# Patient Record
Sex: MALE | Race: WHITE
[De-identification: names, ages, dates, MRNs, and addresses within clinical notes are randomized per-mention and may not be internally consistent; named-entity substitution may affect disease eponyms.]

---

## 2017-08-28 ENCOUNTER — HOSPITAL ENCOUNTER (EMERGENCY)
Dept: HOSPITAL 76 - ED | Age: 47
Discharge: HOME | End: 2017-08-28
Payer: MEDICAID

## 2017-08-28 VITALS — SYSTOLIC BLOOD PRESSURE: 116 MMHG | DIASTOLIC BLOOD PRESSURE: 77 MMHG

## 2017-08-28 DIAGNOSIS — L50.0: Primary | ICD-10-CM

## 2017-08-28 DIAGNOSIS — F17.200: ICD-10-CM

## 2017-08-28 PROCEDURE — 99283 EMERGENCY DEPT VISIT LOW MDM: CPT

## 2017-08-28 PROCEDURE — 99282 EMERGENCY DEPT VISIT SF MDM: CPT

## 2017-11-13 ENCOUNTER — HOSPITAL ENCOUNTER (OUTPATIENT)
Dept: HOSPITAL 76 - LAB.R | Age: 47
Discharge: HOME | End: 2017-11-13
Attending: NURSE PRACTITIONER
Payer: MEDICAID

## 2017-11-13 DIAGNOSIS — N45.4: ICD-10-CM

## 2017-11-13 DIAGNOSIS — S31.104A: Primary | ICD-10-CM

## 2017-11-13 PROCEDURE — 87205 SMEAR GRAM STAIN: CPT

## 2017-11-13 PROCEDURE — 87070 CULTURE OTHR SPECIMN AEROBIC: CPT

## 2018-01-26 ENCOUNTER — HOSPITAL ENCOUNTER (OUTPATIENT)
Dept: HOSPITAL 76 - SDS | Age: 48
Discharge: HOME | End: 2018-01-26
Attending: SURGERY
Payer: MEDICAID

## 2018-01-26 VITALS — DIASTOLIC BLOOD PRESSURE: 80 MMHG | SYSTOLIC BLOOD PRESSURE: 141 MMHG

## 2018-01-26 DIAGNOSIS — L72.0: ICD-10-CM

## 2018-01-26 DIAGNOSIS — L72.3: Primary | ICD-10-CM

## 2018-01-26 PROCEDURE — 0JB80ZZ EXCISION OF ABDOMEN SUBCUTANEOUS TISSUE AND FASCIA, OPEN APPROACH: ICD-10-PCS | Performed by: SURGERY

## 2018-01-26 PROCEDURE — 12032 INTMD RPR S/A/T/EXT 2.6-7.5: CPT

## 2018-01-26 PROCEDURE — 11404 EXC TR-EXT B9+MARG 3.1-4 CM: CPT

## 2019-02-07 ENCOUNTER — HOSPITAL ENCOUNTER (OUTPATIENT)
Dept: HOSPITAL 76 - LAB.N | Age: 49
Discharge: HOME | End: 2019-02-07
Attending: PHYSICIAN ASSISTANT
Payer: MEDICAID

## 2019-02-07 DIAGNOSIS — R50.9: Primary | ICD-10-CM

## 2019-02-07 PROCEDURE — 87275 INFLUENZA B AG IF: CPT

## 2019-02-07 PROCEDURE — 87276 INFLUENZA A AG IF: CPT

## 2019-03-14 ENCOUNTER — HOSPITAL ENCOUNTER (OUTPATIENT)
Age: 49
End: 2019-03-14
Payer: COMMERCIAL

## 2019-03-14 DIAGNOSIS — M25.531: Primary | ICD-10-CM

## 2019-03-14 PROCEDURE — 73110 X-RAY EXAM OF WRIST: CPT

## 2019-03-14 NOTE — DI.RAD.S_ITS
PROCEDURE:  XR WRIST RT MIN 3V  
   
INDICATIONS: wrist pain  
   
TECHNIQUE: 4 views of the wrist were acquired.    
   
COMPARISON:  None.  
   
FINDINGS:    
   
Bones:  No fractures or dislocations.  No suspicious bony lesions.    
   
Scaphoid view: Scaphoid is intact.  
   
Soft tissues:  No suspicious soft tissue calcifications.    
   
IMPRESSION: No fracture or dislocation.  
   
   
   
Dictated by: Klaus Hatch M.D. on 3/14/2019 at 10:09       
Approved by: Klaus Hatch M.D. on 3/14/2019 at 10:10

## 2019-10-16 ENCOUNTER — HOSPITAL ENCOUNTER (EMERGENCY)
Dept: HOSPITAL 76 - ED | Age: 49
Discharge: HOME | End: 2019-10-16
Payer: MEDICAID

## 2019-10-16 VITALS — SYSTOLIC BLOOD PRESSURE: 133 MMHG | DIASTOLIC BLOOD PRESSURE: 85 MMHG

## 2019-10-16 DIAGNOSIS — L98.491: Primary | ICD-10-CM

## 2019-10-16 DIAGNOSIS — F17.200: ICD-10-CM

## 2019-10-16 PROCEDURE — 99282 EMERGENCY DEPT VISIT SF MDM: CPT

## 2019-10-16 PROCEDURE — 99283 EMERGENCY DEPT VISIT LOW MDM: CPT

## 2020-02-04 ENCOUNTER — HOSPITAL ENCOUNTER (OUTPATIENT)
Age: 50
End: 2020-02-04
Payer: COMMERCIAL

## 2020-02-04 DIAGNOSIS — R50.9: Primary | ICD-10-CM

## 2020-02-04 LAB
FLUAV RNA UPPER RESP QL NAA+PROBE: (no result)
FLUBV RNA UPPER RESP QL NAA+PROBE: (no result)

## 2020-02-04 PROCEDURE — 87502 INFLUENZA DNA AMP PROBE: CPT

## 2020-03-09 ENCOUNTER — HOSPITAL ENCOUNTER (OUTPATIENT)
Age: 50
End: 2020-03-09
Payer: COMMERCIAL

## 2020-03-09 DIAGNOSIS — K62.5: Primary | ICD-10-CM

## 2020-03-09 LAB
ADD MANUAL DIFF / SLIDE REVIEW: NO
ALBUMIN SERPL-MCNC: 4.5 G/DL (ref 3.5–5)
ALBUMIN/GLOB SERPL: 1.4 {RATIO} (ref 1–2.8)
ALP SERPL-CCNC: 107 U/L (ref 38–126)
ALT SERPL-CCNC: 37 IU/L (ref ?–50)
BUN SERPL-MCNC: 13 MG/DL (ref 9–20)
CALCIUM SERPL-MCNC: 10.3 MG/DL (ref 8.4–10.2)
CHLORIDE SERPL-SCNC: 106 MMOL/L (ref 98–107)
CO2 SERPL-SCNC: 26 MMOL/L (ref 22–32)
ESTIMATED GLOMERULAR FILT RATE: > 60 ML/MIN (ref 60–?)
GLOBULIN SER CALC-MCNC: 3.3 G/DL (ref 1.7–4.1)
GLUCOSE SERPL-MCNC: 119 MG/DL (ref 70–100)
HEMATOCRIT: 51 % (ref 41–53)
HEMOGLOBIN: 18 G/DL (ref 13.5–17.5)
HEMOLYSIS: 16 (ref 0–50)
INR PPP: 0.9 (ref 0.9–1.3)
LYMPHOCYTES # SPEC AUTO: 2000 /UL (ref 1100–4500)
MCV RBC: 101.8 FL (ref 80–100)
MEAN CORPUSCULAR HEMOGLOBIN: 35.9 PG (ref 26–34)
MEAN CORPUSCULAR HGB CONC: 35.2 % (ref 30–36)
PLATELET COUNT: 147 X10^3/UL (ref 150–400)
POTASSIUM SERPL-SCNC: 4.7 MMOL/L (ref 3.4–5.1)
PROT SERPL-MCNC: 7.8 G/DL (ref 6.3–8.2)
PROTHROMBIN TIME: 10.5 SECONDS (ref 10.1–12.7)
SODIUM SERPL-SCNC: 140 MMOL/L (ref 137–145)

## 2020-03-09 PROCEDURE — 36415 COLL VENOUS BLD VENIPUNCTURE: CPT

## 2020-03-09 PROCEDURE — 85025 COMPLETE CBC W/AUTO DIFF WBC: CPT

## 2020-03-09 PROCEDURE — 85610 PROTHROMBIN TIME: CPT

## 2020-03-09 PROCEDURE — 80053 COMPREHEN METABOLIC PANEL: CPT

## 2020-03-18 ENCOUNTER — HOSPITAL ENCOUNTER (OUTPATIENT)
Age: 50
End: 2020-03-18
Payer: COMMERCIAL

## 2020-03-18 DIAGNOSIS — J34.2: ICD-10-CM

## 2020-03-18 DIAGNOSIS — J32.4: Primary | ICD-10-CM

## 2020-03-18 PROCEDURE — 70486 CT MAXILLOFACIAL W/O DYE: CPT

## 2020-03-18 NOTE — DI.CT.S_ITS
PROCEDURE:  CT SINUS SCREEN WO CON  
   
INDICATIONS:  sinsusitis worsening sxs  
   
TECHNIQUE:   
Noncontrast 3.0 mm axial images acquired from the frontal sinuses to the mid-sella, with   
coronal and sagittal reformats.  For radiation dose reduction, the following was used:    
automated exposure control, adjustment of mA and/or kV according to patient size.    
   
COMPARISON:  None.  
   
FINDINGS:    
Image quality:  Excellent.    
   
Maxillary Sinuses:  No bony remodeling or destruction. Moderate right and mild left   
maxillary sinus because of thickening.  
   
Ethmoid Air Cells:  No bony remodeling or destruction. Severe bilateral ethmoid air cell   
mucosal thickening.  
   
Sphenoid Sinuses:  No bony remodeling or destruction.  Sinuses are clear.    
   
Frontal Sinuses:  No bony remodeling or destruction. Left frontal sinus is clear. Mild   
right frontal sinus mucosal thickening. Nasofrontal recesses are opacified.  
   
Ostiomeatal Complexes:  Ostiomeatal complexes are opacified bilaterally. No Carito cells.   
   
   
Miscellaneous:  Visualized intra-orbital contents are normal.  No zee bullosa or   
paradoxical turbinate curvature. There is mild leftward deviation of the nasal septum.  
   
   
IMPRESSION:  
1. Pan sinus mucosal disease as described above.  
2. Bilateral ostiomeatal unit and nasofrontal recess opacification.  
3. Nasal septal deviation.  
   
   
   
Dictated by: Bailee Contreras M.D. on 3/18/2020 at 15:44       
Approved by: Bailee Contreras M.D. on 3/18/2020 at 15:46

## 2020-04-28 ENCOUNTER — HOSPITAL ENCOUNTER (OUTPATIENT)
Dept: HOSPITAL 76 - DI | Age: 50
Discharge: HOME | End: 2020-04-28
Attending: ORTHOPAEDIC SURGERY
Payer: COMMERCIAL

## 2020-04-28 DIAGNOSIS — S42.001D: ICD-10-CM

## 2020-04-28 DIAGNOSIS — M25.562: Primary | ICD-10-CM

## 2021-12-22 ENCOUNTER — HOSPITAL ENCOUNTER (OUTPATIENT)
Dept: HOSPITAL 76 - EMS | Age: 51
Discharge: ANOTHER HOSPITAL | End: 2021-12-22
Attending: EMERGENCY MEDICINE
Payer: MEDICAID

## 2021-12-22 ENCOUNTER — HOSPITAL ENCOUNTER (EMERGENCY)
Dept: HOSPITAL 76 - ED | Age: 51
Discharge: ANOTHER HOSPITAL | End: 2021-12-22
Payer: COMMERCIAL

## 2021-12-22 ENCOUNTER — HOSPITAL ENCOUNTER (OUTPATIENT)
Dept: HOSPITAL 76 - EMS | Age: 51
Discharge: ANOTHER HOSPITAL | End: 2021-12-22
Payer: MEDICAID

## 2021-12-22 VITALS — DIASTOLIC BLOOD PRESSURE: 106 MMHG | SYSTOLIC BLOOD PRESSURE: 153 MMHG

## 2021-12-22 DIAGNOSIS — I21.3: Primary | ICD-10-CM

## 2021-12-22 DIAGNOSIS — F17.200: ICD-10-CM

## 2021-12-22 DIAGNOSIS — R07.89: Primary | ICD-10-CM

## 2021-12-22 LAB
ALBUMIN DIAFP-MCNC: 4.2 G/DL (ref 3.2–5.5)
ALBUMIN/GLOB SERPL: 1.2 {RATIO} (ref 1–2.2)
ALP SERPL-CCNC: 79 IU/L (ref 42–121)
ALT SERPL W P-5'-P-CCNC: 32 IU/L (ref 10–60)
ANION GAP SERPL CALCULATED.4IONS-SCNC: 10 MMOL/L (ref 6–13)
AST SERPL W P-5'-P-CCNC: 19 IU/L (ref 10–42)
B PARAPERT DNA SPEC QL NAA+PROBE: NOT DETECTED
B PERT DNA SPEC QL NAA+PROBE: NOT DETECTED
BASOPHILS NFR BLD AUTO: 0.1 10^3/UL (ref 0–0.1)
BASOPHILS NFR BLD AUTO: 0.7 %
BILIRUB BLD-MCNC: 0.5 MG/DL (ref 0.2–1)
BUN SERPL-MCNC: 13 MG/DL (ref 6–20)
C PNEUM DNA NPH QL NAA+NON-PROBE: NOT DETECTED
CALCIUM UR-MCNC: 9.9 MG/DL (ref 8.5–10.3)
CHLORIDE SERPL-SCNC: 99 MMOL/L (ref 101–111)
CK SERPL-CCNC: 69 IU/L (ref 22–269)
CO2 SERPL-SCNC: 22 MMOL/L (ref 21–32)
CREAT SERPLBLD-SCNC: 0.8 MG/DL (ref 0.6–1.2)
CRP SERPL-MCNC: 1.3 MG/DL (ref 0–1)
EOSINOPHIL # BLD AUTO: 0.1 10^3/UL (ref 0–0.7)
EOSINOPHIL NFR BLD AUTO: 1.4 %
ERYTHROCYTE [DISTWIDTH] IN BLOOD BY AUTOMATED COUNT: 11.6 % (ref 12–15)
FLUAV RNA RESP QL NAA+PROBE: NOT DETECTED
GFRSERPLBLD MDRD-ARVRAT: 103 ML/MIN/{1.73_M2} (ref 89–?)
GLOBULIN SER-MCNC: 3.4 G/DL (ref 2.1–4.2)
GLUCOSE SERPL-MCNC: 276 MG/DL (ref 70–100)
HAEM INFLU B DNA SPEC QL NAA+PROBE: NOT DETECTED
HCOV 229E RNA SPEC QL NAA+PROBE: NOT DETECTED
HCOV HKU1 RNA UPPER RESP QL NAA+PROBE: NOT DETECTED
HCOV NL63 RNA ASPIRATE QL NAA+PROBE: NOT DETECTED
HCOV OC43 RNA SPEC QL NAA+PROBE: NOT DETECTED
HCT VFR BLD AUTO: 46.4 % (ref 42–52)
HGB UR QL STRIP: 16.3 G/DL (ref 14–18)
HMPV AG SPEC QL: NOT DETECTED
HPIV1 RNA NPH QL NAA+PROBE: NOT DETECTED
HPIV2 SPEC QL CULT: NOT DETECTED
HPIV3 AB TITR SER CF: NOT DETECTED {TITER}
HPIV4 RNA SPEC QL NAA+PROBE: NOT DETECTED
LIPASE SERPL-CCNC: 27 U/L (ref 22–51)
LYMPHOCYTES # SPEC AUTO: 2.7 10^3/UL (ref 1.5–3.5)
LYMPHOCYTES NFR BLD AUTO: 30.8 %
M PNEUMO DNA SPEC QL NAA+PROBE: NOT DETECTED
MAGNESIUM SERPL-MCNC: 2.2 MG/DL (ref 1.7–2.8)
MCH RBC QN AUTO: 35.4 PG (ref 27–31)
MCHC RBC AUTO-ENTMCNC: 35.1 G/DL (ref 32–36)
MCV RBC AUTO: 100.9 FL (ref 80–94)
MONOCYTES # BLD AUTO: 0.5 10^3/UL (ref 0–1)
MONOCYTES NFR BLD AUTO: 5.8 %
NEUTROPHILS # BLD AUTO: 5.3 10^3/UL (ref 1.5–6.6)
NEUTROPHILS # SNV AUTO: 8.7 X10^3/UL (ref 4.8–10.8)
NEUTROPHILS NFR BLD AUTO: 60.7 %
NRBC # BLD AUTO: 0 /100WBC
NRBC # BLD AUTO: 0 X10^3/UL
PDW BLD AUTO: 11.8 FL (ref 7.4–11.4)
PLATELET # BLD: 153 10^3/UL (ref 130–450)
POTASSIUM SERPL-SCNC: 3.8 MMOL/L (ref 3.5–5)
PROT SPEC-MCNC: 7.6 G/DL (ref 6.7–8.2)
RBC MAR: 4.6 10^6/UL (ref 4.7–6.1)
RSV RNA RESP QL NAA+PROBE: NOT DETECTED
RV+EV RNA SPEC QL NAA+PROBE: NOT DETECTED
SARS-COV-2 RNA PNL SPEC NAA+PROBE: NOT DETECTED
SODIUM SERPLBLD-SCNC: 131 MMOL/L (ref 135–145)

## 2021-12-22 PROCEDURE — 71045 X-RAY EXAM CHEST 1 VIEW: CPT

## 2021-12-22 PROCEDURE — 96375 TX/PRO/DX INJ NEW DRUG ADDON: CPT

## 2021-12-22 PROCEDURE — 71275 CT ANGIOGRAPHY CHEST: CPT

## 2021-12-22 PROCEDURE — 36415 COLL VENOUS BLD VENIPUNCTURE: CPT

## 2021-12-22 PROCEDURE — 96376 TX/PRO/DX INJ SAME DRUG ADON: CPT

## 2021-12-22 PROCEDURE — 0202U NFCT DS 22 TRGT SARS-COV-2: CPT

## 2021-12-22 PROCEDURE — 93005 ELECTROCARDIOGRAM TRACING: CPT

## 2021-12-22 PROCEDURE — 80053 COMPREHEN METABOLIC PANEL: CPT

## 2021-12-22 PROCEDURE — 85025 COMPLETE CBC W/AUTO DIFF WBC: CPT

## 2021-12-22 PROCEDURE — 99291 CRITICAL CARE FIRST HOUR: CPT

## 2021-12-22 PROCEDURE — 86140 C-REACTIVE PROTEIN: CPT

## 2021-12-22 PROCEDURE — 84484 ASSAY OF TROPONIN QUANT: CPT

## 2021-12-22 PROCEDURE — 96365 THER/PROPH/DIAG IV INF INIT: CPT

## 2021-12-22 PROCEDURE — 82550 ASSAY OF CK (CPK): CPT

## 2021-12-22 PROCEDURE — 83880 ASSAY OF NATRIURETIC PEPTIDE: CPT

## 2021-12-22 PROCEDURE — 83735 ASSAY OF MAGNESIUM: CPT

## 2021-12-22 PROCEDURE — 83690 ASSAY OF LIPASE: CPT

## 2022-04-21 ENCOUNTER — HOSPITAL ENCOUNTER (OUTPATIENT)
Dept: HOSPITAL 73 - ED | Age: 52
Setting detail: OBSERVATION
LOS: 1 days | Discharge: HOME | End: 2022-04-22
Payer: COMMERCIAL

## 2022-04-21 VITALS
RESPIRATION RATE: 19 BRPM | HEART RATE: 62 BPM | DIASTOLIC BLOOD PRESSURE: 75 MMHG | SYSTOLIC BLOOD PRESSURE: 129 MMHG | OXYGEN SATURATION: 98 %

## 2022-04-21 VITALS
HEART RATE: 71 BPM | TEMPERATURE: 97.8 F | OXYGEN SATURATION: 99 % | RESPIRATION RATE: 16 BRPM | DIASTOLIC BLOOD PRESSURE: 76 MMHG | SYSTOLIC BLOOD PRESSURE: 140 MMHG

## 2022-04-21 VITALS
SYSTOLIC BLOOD PRESSURE: 126 MMHG | RESPIRATION RATE: 13 BRPM | HEART RATE: 64 BPM | DIASTOLIC BLOOD PRESSURE: 77 MMHG | OXYGEN SATURATION: 98 %

## 2022-04-21 VITALS
RESPIRATION RATE: 16 BRPM | DIASTOLIC BLOOD PRESSURE: 75 MMHG | OXYGEN SATURATION: 100 % | HEART RATE: 58 BPM | SYSTOLIC BLOOD PRESSURE: 140 MMHG

## 2022-04-21 VITALS — HEART RATE: 62 BPM | RESPIRATION RATE: 15 BRPM | OXYGEN SATURATION: 98 %

## 2022-04-21 VITALS
SYSTOLIC BLOOD PRESSURE: 126 MMHG | RESPIRATION RATE: 17 BRPM | OXYGEN SATURATION: 98 % | HEART RATE: 63 BPM | DIASTOLIC BLOOD PRESSURE: 80 MMHG

## 2022-04-21 VITALS
HEART RATE: 63 BPM | RESPIRATION RATE: 11 BRPM | DIASTOLIC BLOOD PRESSURE: 79 MMHG | OXYGEN SATURATION: 97 % | SYSTOLIC BLOOD PRESSURE: 121 MMHG

## 2022-04-21 VITALS
SYSTOLIC BLOOD PRESSURE: 129 MMHG | DIASTOLIC BLOOD PRESSURE: 78 MMHG | HEART RATE: 60 BPM | RESPIRATION RATE: 16 BRPM | OXYGEN SATURATION: 100 %

## 2022-04-21 VITALS
SYSTOLIC BLOOD PRESSURE: 126 MMHG | HEART RATE: 61 BPM | RESPIRATION RATE: 18 BRPM | OXYGEN SATURATION: 99 % | DIASTOLIC BLOOD PRESSURE: 80 MMHG

## 2022-04-21 VITALS
RESPIRATION RATE: 9 BRPM | HEART RATE: 60 BPM | SYSTOLIC BLOOD PRESSURE: 124 MMHG | OXYGEN SATURATION: 98 % | DIASTOLIC BLOOD PRESSURE: 78 MMHG

## 2022-04-21 VITALS
HEART RATE: 62 BPM | OXYGEN SATURATION: 98 % | SYSTOLIC BLOOD PRESSURE: 119 MMHG | DIASTOLIC BLOOD PRESSURE: 82 MMHG | RESPIRATION RATE: 11 BRPM

## 2022-04-21 VITALS
HEART RATE: 63 BPM | SYSTOLIC BLOOD PRESSURE: 125 MMHG | DIASTOLIC BLOOD PRESSURE: 80 MMHG | OXYGEN SATURATION: 100 % | RESPIRATION RATE: 17 BRPM

## 2022-04-21 VITALS — OXYGEN SATURATION: 99 % | SYSTOLIC BLOOD PRESSURE: 140 MMHG | HEART RATE: 71 BPM | DIASTOLIC BLOOD PRESSURE: 76 MMHG

## 2022-04-21 VITALS
RESPIRATION RATE: 18 BRPM | OXYGEN SATURATION: 99 % | DIASTOLIC BLOOD PRESSURE: 75 MMHG | SYSTOLIC BLOOD PRESSURE: 122 MMHG | HEART RATE: 62 BPM

## 2022-04-21 VITALS
HEART RATE: 69 BPM | RESPIRATION RATE: 18 BRPM | TEMPERATURE: 96.6 F | SYSTOLIC BLOOD PRESSURE: 126 MMHG | DIASTOLIC BLOOD PRESSURE: 77 MMHG | OXYGEN SATURATION: 97 %

## 2022-04-21 VITALS — OXYGEN SATURATION: 98 % | HEART RATE: 65 BPM | RESPIRATION RATE: 18 BRPM

## 2022-04-21 VITALS — BODY MASS INDEX: 23.3 KG/M2

## 2022-04-21 DIAGNOSIS — I25.10: ICD-10-CM

## 2022-04-21 DIAGNOSIS — I25.2: ICD-10-CM

## 2022-04-21 DIAGNOSIS — F17.210: ICD-10-CM

## 2022-04-21 DIAGNOSIS — E11.9: ICD-10-CM

## 2022-04-21 DIAGNOSIS — Z20.822: ICD-10-CM

## 2022-04-21 DIAGNOSIS — Z79.84: ICD-10-CM

## 2022-04-21 DIAGNOSIS — R07.9: Primary | ICD-10-CM

## 2022-04-21 DIAGNOSIS — Z79.82: ICD-10-CM

## 2022-04-21 LAB
ADD MANUAL DIFF / SLIDE REVIEW: NO
ALBUMIN SERPL-MCNC: 4.7 G/DL (ref 3.5–5)
ALBUMIN/GLOB SERPL: 1.6 {RATIO} (ref 1–2.8)
ALP SERPL-CCNC: 95 U/L (ref 38–126)
ALT SERPL-CCNC: 49 IU/L (ref ?–50)
BUN SERPL-MCNC: 15 MG/DL (ref 9–20)
CALCIUM SERPL-MCNC: 9.5 MG/DL (ref 8.4–10.2)
CHLORIDE SERPL-SCNC: 105 MMOL/L (ref 98–107)
CK SERPL-CCNC: 68 U/L (ref 55–170)
CKMB % RELATIVE INDEX: (no result) % (ref 1.5–5)
CO2 SERPL-SCNC: 27 MMOL/L (ref 22–32)
ESTIMATED GLOMERULAR FILT RATE: > 60 ML/MIN (ref 60–?)
GLOBULIN SER CALC-MCNC: 3 G/DL (ref 1.7–4.1)
GLUCOSE SERPL-MCNC: 143 MG/DL (ref 70–100)
HBA1C MFR BLD: 6.3 % (ref 4–6)
HEMATOCRIT: 43.1 % (ref 41–53)
HEMOGLOBIN: 14.6 G/DL (ref 13.5–17.5)
HEMOLYSIS: 19 (ref 0–50)
INR PPP: 0.9 (ref 0.9–1.3)
LIPASE SERPL-CCNC: 43 U/L (ref 23–300)
LYMPHOCYTES # SPEC AUTO: 2000 /UL (ref 1100–4500)
MAGNESIUM SERPL-MCNC: 2.1 MG/DL (ref 1.6–2.3)
MCV RBC: 99.1 FL (ref 80–100)
MEAN CORPUSCULAR HEMOGLOBIN: 33.6 PG (ref 26–34)
MEAN CORPUSCULAR HGB CONC: 33.9 % (ref 30–36)
NT-PROBNP SERPL-MCNC: 72 PG/ML (ref ?–125)
PLATELET COUNT: 141 X10^3/UL (ref 150–400)
POTASSIUM SERPL-SCNC: 4.4 MMOL/L (ref 3.4–5.1)
PROT SERPL-MCNC: 7.7 G/DL (ref 6.3–8.2)
PROTHROMBIN TIME: 10.4 SECONDS (ref 10.1–12.7)
PTT PARTIAL THROMBOPLASTIN TIM: 33 SECONDS (ref 26.4–36.2)
SODIUM SERPL-SCNC: 139 MMOL/L (ref 137–145)
TROPONIN I SERPL-MCNC: 0.03 NG/ML (ref 0.01–0.03)
TROPONIN I SERPL-MCNC: < 0.012 NG/ML (ref 0.01–0.03)
TROPONIN I SERPL-MCNC: < 0.012 NG/ML (ref 0.01–0.03)

## 2022-04-21 PROCEDURE — 83036 HEMOGLOBIN GLYCOSYLATED A1C: CPT

## 2022-04-21 PROCEDURE — 96372 THER/PROPH/DIAG INJ SC/IM: CPT

## 2022-04-21 PROCEDURE — 84484 ASSAY OF TROPONIN QUANT: CPT

## 2022-04-21 PROCEDURE — 83690 ASSAY OF LIPASE: CPT

## 2022-04-21 PROCEDURE — 80061 LIPID PANEL: CPT

## 2022-04-21 PROCEDURE — 99285 EMERGENCY DEPT VISIT HI MDM: CPT

## 2022-04-21 PROCEDURE — 93018 CV STRESS TEST I&R ONLY: CPT

## 2022-04-21 PROCEDURE — A9502 TC99M TETROFOSMIN: HCPCS

## 2022-04-21 PROCEDURE — 82550 ASSAY OF CK (CPK): CPT

## 2022-04-21 PROCEDURE — 93306 TTE W/DOPPLER COMPLETE: CPT

## 2022-04-21 PROCEDURE — 93005 ELECTROCARDIOGRAM TRACING: CPT

## 2022-04-21 PROCEDURE — G0378 HOSPITAL OBSERVATION PER HR: HCPCS

## 2022-04-21 PROCEDURE — 83880 ASSAY OF NATRIURETIC PEPTIDE: CPT

## 2022-04-21 PROCEDURE — 80048 BASIC METABOLIC PNL TOTAL CA: CPT

## 2022-04-21 PROCEDURE — 82962 GLUCOSE BLOOD TEST: CPT

## 2022-04-21 PROCEDURE — 71045 X-RAY EXAM CHEST 1 VIEW: CPT

## 2022-04-21 PROCEDURE — 84443 ASSAY THYROID STIM HORMONE: CPT

## 2022-04-21 PROCEDURE — 99284 EMERGENCY DEPT VISIT MOD MDM: CPT

## 2022-04-21 PROCEDURE — 87635 SARS-COV-2 COVID-19 AMP PRB: CPT

## 2022-04-21 PROCEDURE — 85610 PROTHROMBIN TIME: CPT

## 2022-04-21 PROCEDURE — 78452 HT MUSCLE IMAGE SPECT MULT: CPT

## 2022-04-21 PROCEDURE — 85730 THROMBOPLASTIN TIME PARTIAL: CPT

## 2022-04-21 PROCEDURE — 36415 COLL VENOUS BLD VENIPUNCTURE: CPT

## 2022-04-21 PROCEDURE — 85025 COMPLETE CBC W/AUTO DIFF WBC: CPT

## 2022-04-21 PROCEDURE — 83735 ASSAY OF MAGNESIUM: CPT

## 2022-04-21 PROCEDURE — 80053 COMPREHEN METABOLIC PANEL: CPT

## 2022-04-22 VITALS
OXYGEN SATURATION: 97 % | DIASTOLIC BLOOD PRESSURE: 69 MMHG | RESPIRATION RATE: 14 BRPM | SYSTOLIC BLOOD PRESSURE: 111 MMHG | HEART RATE: 66 BPM | TEMPERATURE: 98.06 F

## 2022-04-22 VITALS
HEART RATE: 67 BPM | TEMPERATURE: 98.06 F | RESPIRATION RATE: 19 BRPM | OXYGEN SATURATION: 98 % | SYSTOLIC BLOOD PRESSURE: 100 MMHG | DIASTOLIC BLOOD PRESSURE: 67 MMHG

## 2022-04-22 VITALS
SYSTOLIC BLOOD PRESSURE: 109 MMHG | TEMPERATURE: 97.52 F | DIASTOLIC BLOOD PRESSURE: 68 MMHG | OXYGEN SATURATION: 97 % | RESPIRATION RATE: 18 BRPM | HEART RATE: 65 BPM

## 2022-04-22 VITALS
SYSTOLIC BLOOD PRESSURE: 109 MMHG | RESPIRATION RATE: 14 BRPM | OXYGEN SATURATION: 98 % | HEART RATE: 56 BPM | DIASTOLIC BLOOD PRESSURE: 65 MMHG | TEMPERATURE: 98.1 F

## 2022-04-22 LAB
ADD MANUAL DIFF / SLIDE REVIEW: NO
BUN SERPL-MCNC: 16 MG/DL (ref 9–20)
CALCIUM SERPL-MCNC: 8.7 MG/DL (ref 8.4–10.2)
CHLORIDE SERPL-SCNC: 105 MMOL/L (ref 98–107)
CHOLEST SERPL-MCNC: 152 MG/DL (ref 140–199)
CO2 SERPL-SCNC: 26 MMOL/L (ref 22–32)
ESTIMATED GLOMERULAR FILT RATE: > 60 ML/MIN (ref 60–?)
GLUCOSE SERPL-MCNC: 164 MG/DL (ref 70–100)
HDLC SERPL-MCNC: 45 MG/DL (ref 40–60)
HEMATOCRIT: 40.3 % (ref 41–53)
HEMOGLOBIN: 13.9 G/DL (ref 13.5–17.5)
HEMOLYSIS: < 15 (ref 0–50)
LYMPHOCYTES # SPEC AUTO: 2000 /UL (ref 1100–4500)
MAGNESIUM SERPL-MCNC: 2.2 MG/DL (ref 1.6–2.3)
MCV RBC: 98.4 FL (ref 80–100)
MEAN CORPUSCULAR HEMOGLOBIN: 34 PG (ref 26–34)
MEAN CORPUSCULAR HGB CONC: 34.5 % (ref 30–36)
PLATELET COUNT: 126 X10^3/UL (ref 150–400)
POTASSIUM SERPL-SCNC: 4.1 MMOL/L (ref 3.4–5.1)
SODIUM SERPL-SCNC: 138 MMOL/L (ref 137–145)
TRIGL SERPL-MCNC: 182 MG/DL (ref 35–150)
TROPONIN I SERPL-MCNC: < 0.012 NG/ML (ref 0.01–0.03)
TSH SERPL DL<=0.05 MIU/L-ACNC: 2.4 UIU/ML (ref 0.47–4.68)

## 2022-04-22 RX ADMIN — ENOXAPARIN SODIUM 40 MG: 40 INJECTION SUBCUTANEOUS at 09:17

## 2022-04-22 RX ADMIN — ASPIRIN 81 MG: 81 TABLET, CHEWABLE ORAL at 09:19

## 2022-04-22 RX ADMIN — CLOPIDOGREL 75 MG: 75 TABLET, FILM COATED ORAL at 09:19

## 2022-07-13 ENCOUNTER — HOSPITAL ENCOUNTER (OUTPATIENT)
Dept: HOSPITAL 73 - CAR | Age: 52
End: 2022-07-13
Payer: COMMERCIAL

## 2022-07-13 DIAGNOSIS — I25.2: ICD-10-CM

## 2022-07-13 DIAGNOSIS — Z95.5: Primary | ICD-10-CM

## 2022-07-13 PROCEDURE — 93798 PHYS/QHP OP CAR RHAB W/ECG: CPT

## 2022-12-29 ENCOUNTER — HOSPITAL ENCOUNTER (EMERGENCY)
Dept: HOSPITAL 76 - ED | Age: 52
Discharge: HOME | End: 2022-12-29
Payer: MEDICAID

## 2022-12-29 VITALS — DIASTOLIC BLOOD PRESSURE: 73 MMHG | SYSTOLIC BLOOD PRESSURE: 120 MMHG

## 2022-12-29 DIAGNOSIS — Z87.891: ICD-10-CM

## 2022-12-29 DIAGNOSIS — R07.9: Primary | ICD-10-CM

## 2022-12-29 LAB
ALBUMIN DIAFP-MCNC: 4.2 G/DL (ref 3.2–5.5)
ALBUMIN/GLOB SERPL: 1.4 {RATIO} (ref 1–2.2)
ALP SERPL-CCNC: 84 IU/L (ref 42–121)
ALT SERPL W P-5'-P-CCNC: 69 IU/L (ref 10–60)
ANION GAP SERPL CALCULATED.4IONS-SCNC: 7 MMOL/L (ref 6–13)
AST SERPL W P-5'-P-CCNC: 39 IU/L (ref 10–42)
BASOPHILS NFR BLD AUTO: 0 10^3/UL (ref 0–0.1)
BASOPHILS NFR BLD AUTO: 0.8 %
BILIRUB BLD-MCNC: 0.8 MG/DL (ref 0.2–1)
BUN SERPL-MCNC: 12 MG/DL (ref 6–20)
CALCIUM UR-MCNC: 9.1 MG/DL (ref 8.5–10.3)
CHLORIDE SERPL-SCNC: 99 MMOL/L (ref 101–111)
CO2 SERPL-SCNC: 26 MMOL/L (ref 21–32)
CREAT SERPLBLD-SCNC: 0.8 MG/DL (ref 0.6–1.2)
EOSINOPHIL # BLD AUTO: 0.1 10^3/UL (ref 0–0.7)
EOSINOPHIL NFR BLD AUTO: 1.8 %
ERYTHROCYTE [DISTWIDTH] IN BLOOD BY AUTOMATED COUNT: 11.4 % (ref 12–15)
GFRSERPLBLD MDRD-ARVRAT: 102 ML/MIN/{1.73_M2} (ref 89–?)
GLOBULIN SER-MCNC: 2.9 G/DL (ref 2.1–4.2)
GLUCOSE SERPL-MCNC: 299 MG/DL (ref 70–100)
HCT VFR BLD AUTO: 44.7 % (ref 42–52)
HGB UR QL STRIP: 15.5 G/DL (ref 14–18)
LIPASE SERPL-CCNC: 27 U/L (ref 22–51)
LYMPHOCYTES # SPEC AUTO: 1.4 10^3/UL (ref 1.5–3.5)
LYMPHOCYTES NFR BLD AUTO: 28.1 %
MCH RBC QN AUTO: 33.2 PG (ref 27–31)
MCHC RBC AUTO-ENTMCNC: 34.7 G/DL (ref 32–36)
MCV RBC AUTO: 95.7 FL (ref 80–94)
MONOCYTES # BLD AUTO: 0.5 10^3/UL (ref 0–1)
MONOCYTES NFR BLD AUTO: 9.4 %
NEUTROPHILS # BLD AUTO: 3 10^3/UL (ref 1.5–6.6)
NEUTROPHILS # SNV AUTO: 5 X10^3/UL (ref 4.8–10.8)
NEUTROPHILS NFR BLD AUTO: 59.7 %
NRBC # BLD AUTO: 0 /100WBC
NRBC # BLD AUTO: 0 X10^3/UL
PDW BLD AUTO: 10.7 FL (ref 7.4–11.4)
PLATELET # BLD: 134 10^3/UL (ref 130–450)
POTASSIUM SERPL-SCNC: 4.4 MMOL/L (ref 3.5–5)
PROT SPEC-MCNC: 7.1 G/DL (ref 6.7–8.2)
RBC MAR: 4.67 10^6/UL (ref 4.7–6.1)
SODIUM SERPLBLD-SCNC: 132 MMOL/L (ref 135–145)

## 2022-12-29 PROCEDURE — 80053 COMPREHEN METABOLIC PANEL: CPT

## 2022-12-29 PROCEDURE — 83690 ASSAY OF LIPASE: CPT

## 2022-12-29 PROCEDURE — 99284 EMERGENCY DEPT VISIT MOD MDM: CPT

## 2022-12-29 PROCEDURE — 85025 COMPLETE CBC W/AUTO DIFF WBC: CPT

## 2022-12-29 PROCEDURE — 93005 ELECTROCARDIOGRAM TRACING: CPT

## 2022-12-29 PROCEDURE — 84484 ASSAY OF TROPONIN QUANT: CPT

## 2022-12-29 PROCEDURE — 36415 COLL VENOUS BLD VENIPUNCTURE: CPT

## 2023-09-25 ENCOUNTER — HOSPITAL ENCOUNTER (OUTPATIENT)
Dept: HOSPITAL 73 - RAD | Age: 53
End: 2023-09-25
Payer: COMMERCIAL

## 2023-09-25 VITALS — BODY MASS INDEX: 23.3 KG/M2

## 2023-09-25 DIAGNOSIS — M25.511: ICD-10-CM

## 2023-09-25 DIAGNOSIS — M19.011: Primary | ICD-10-CM

## 2023-09-25 PROCEDURE — 23350 INJECTION FOR SHOULDER X-RAY: CPT

## 2023-09-25 PROCEDURE — 73222 MRI JOINT UPR EXTREM W/DYE: CPT

## 2023-09-25 PROCEDURE — 77002 NEEDLE LOCALIZATION BY XRAY: CPT

## 2023-09-25 RX ADMIN — LIDOCAINE HYDROCHLORIDE 20 ML: 10 INJECTION, SOLUTION INFILTRATION; PERINEURAL at 15:04

## 2024-02-06 ENCOUNTER — HOSPITAL ENCOUNTER (OUTPATIENT)
Dept: HOSPITAL 76 - LAB | Age: 54
Discharge: HOME | End: 2024-02-06
Attending: INTERNAL MEDICINE
Payer: MEDICAID

## 2024-02-06 DIAGNOSIS — I25.119: ICD-10-CM

## 2024-02-06 DIAGNOSIS — E78.5: Primary | ICD-10-CM

## 2024-02-06 LAB
ANION GAP SERPL CALCULATED.4IONS-SCNC: 4 MMOL/L (ref 6–13)
BASOPHILS NFR BLD AUTO: 0 10^3/UL (ref 0–0.1)
BASOPHILS NFR BLD AUTO: 0.6 %
BUN SERPL-MCNC: 14 MG/DL (ref 6–20)
CALCIUM UR-MCNC: 9.8 MG/DL (ref 8.5–10.3)
CHLORIDE SERPL-SCNC: 106 MMOL/L (ref 101–111)
CHOLEST SERPL-MCNC: 99 MG/DL
CO2 SERPL-SCNC: 29 MMOL/L (ref 21–32)
CREAT SERPLBLD-SCNC: 0.8 MG/DL (ref 0.6–1.3)
EOSINOPHIL # BLD AUTO: 0.1 10^3/UL (ref 0–0.7)
EOSINOPHIL NFR BLD AUTO: 1.6 %
ERYTHROCYTE [DISTWIDTH] IN BLOOD BY AUTOMATED COUNT: 11.9 % (ref 12–15)
GFRSERPLBLD MDRD-ARVRAT: 102 ML/MIN/{1.73_M2} (ref 89–?)
GLUCOSE SERPL-MCNC: 196 MG/DL (ref 74–104)
HCT VFR BLD AUTO: 46.8 % (ref 42–52)
HDLC SERPL-MCNC: 39 MG/DL
HDLC SERPL: 2.5 {RATIO} (ref ?–5)
HGB UR QL STRIP: 15.4 G/DL (ref 14–18)
LDLC SERPL CALC-MCNC: 26 MG/DL
LDLC/HDLC SERPL: 0.7 {RATIO} (ref ?–3.6)
LYMPHOCYTES # SPEC AUTO: 2.2 10^3/UL (ref 1.5–3.5)
LYMPHOCYTES NFR BLD AUTO: 31 %
MCH RBC QN AUTO: 33.3 PG (ref 27–31)
MCHC RBC AUTO-ENTMCNC: 32.9 G/DL (ref 32–36)
MCV RBC AUTO: 101.3 FL (ref 80–94)
MONOCYTES # BLD AUTO: 0.4 10^3/UL (ref 0–1)
MONOCYTES NFR BLD AUTO: 6.2 %
NEUTROPHILS # BLD AUTO: 4.2 10^3/UL (ref 1.5–6.6)
NEUTROPHILS # SNV AUTO: 7 X10^3/UL (ref 4.8–10.8)
NEUTROPHILS NFR BLD AUTO: 60.5 %
NRBC # BLD AUTO: 0 /100WBC
NRBC # BLD AUTO: 0 X10^3/UL
PDW BLD AUTO: 10.9 FL (ref 7.4–11.4)
PLATELET # BLD: 131 10^3/UL (ref 130–450)
POTASSIUM SERPL-SCNC: 5.2 MMOL/L (ref 3.5–4.5)
RBC MAR: 4.62 10^6/UL (ref 4.7–6.1)
SODIUM SERPLBLD-SCNC: 139 MMOL/L (ref 135–145)
TRIGL P FAST SERPL-MCNC: 169 MG/DL (ref 48–352)
VLDLC SERPL-SCNC: 34 MG/DL

## 2024-02-06 PROCEDURE — 83721 ASSAY OF BLOOD LIPOPROTEIN: CPT

## 2024-02-06 PROCEDURE — 85025 COMPLETE CBC W/AUTO DIFF WBC: CPT

## 2024-02-06 PROCEDURE — 80061 LIPID PANEL: CPT

## 2024-02-06 PROCEDURE — 36415 COLL VENOUS BLD VENIPUNCTURE: CPT

## 2024-02-06 PROCEDURE — 80048 BASIC METABOLIC PNL TOTAL CA: CPT

## 2024-06-08 ENCOUNTER — HOSPITAL ENCOUNTER (OUTPATIENT)
Dept: HOSPITAL 76 - DI | Age: 54
Discharge: HOME | End: 2024-06-08
Payer: MEDICAID

## 2024-06-08 DIAGNOSIS — S67.193A: Primary | ICD-10-CM
